# Patient Record
Sex: MALE | URBAN - METROPOLITAN AREA
[De-identification: names, ages, dates, MRNs, and addresses within clinical notes are randomized per-mention and may not be internally consistent; named-entity substitution may affect disease eponyms.]

---

## 2024-01-31 ENCOUNTER — APPOINTMENT (RX ONLY)
Dept: URBAN - METROPOLITAN AREA CLINIC 2 | Facility: CLINIC | Age: 24
Setting detail: DERMATOLOGY
End: 2024-01-31

## 2024-01-31 DIAGNOSIS — L64.8 OTHER ANDROGENIC ALOPECIA: ICD-10-CM | Status: INADEQUATELY CONTROLLED

## 2024-01-31 PROCEDURE — ? TREATMENT REGIMEN

## 2024-01-31 PROCEDURE — ? COUNSELING

## 2024-01-31 PROCEDURE — 99204 OFFICE O/P NEW MOD 45 MIN: CPT | Mod: 95

## 2024-01-31 PROCEDURE — ? PRESCRIPTION

## 2024-01-31 RX ORDER — MINOXIDIL 2.5 MG/1
1 TABLET ORAL QD
Qty: 30 | Refills: 11 | Status: ERX | COMMUNITY
Start: 2024-01-31

## 2024-01-31 RX ADMIN — MINOXIDIL 1: 2.5 TABLET ORAL at 00:00

## 2024-01-31 ASSESSMENT — LOCATION ZONE DERM: LOCATION ZONE: FACE

## 2024-01-31 ASSESSMENT — LOCATION SIMPLE DESCRIPTION DERM: LOCATION SIMPLE: LEFT FOREHEAD

## 2024-01-31 ASSESSMENT — LOCATION DETAILED DESCRIPTION DERM: LOCATION DETAILED: LEFT SUPERIOR FOREHEAD

## 2024-01-31 NOTE — PROCEDURE: TREATMENT REGIMEN
Detail Level: Generalized
Plan: Consider Finasteride in ~6 months if Minoxidil doesn’t improve hair loss.\\nAdvised pt to upload photos to his chart so we can follow his hair growth.
Initiate Treatment: Minoxidil oral daily. Start with 1/2 pill QD for 3-4 weeks then if tolerating ok can increase to 1 pill QD.

## 2024-01-31 NOTE — HPI: HAIR LOSS
Previous Labs: No
How Did The Hair Loss Occur?: gradual in onset
How Severe Is Your Hair Loss?: mild
What Hair Products Do You Use?: Cnochita for styling

## 2024-06-05 ENCOUNTER — APPOINTMENT (RX ONLY)
Dept: URBAN - METROPOLITAN AREA CLINIC 2 | Facility: CLINIC | Age: 24
Setting detail: DERMATOLOGY
End: 2024-06-05

## 2024-06-05 DIAGNOSIS — L64.8 OTHER ANDROGENIC ALOPECIA: ICD-10-CM

## 2024-06-05 PROCEDURE — ? COUNSELING

## 2024-06-05 PROCEDURE — ? TREATMENT REGIMEN

## 2024-06-05 PROCEDURE — 99213 OFFICE O/P EST LOW 20 MIN: CPT

## 2024-06-05 ASSESSMENT — LOCATION ZONE DERM: LOCATION ZONE: FACE

## 2024-06-05 ASSESSMENT — LOCATION SIMPLE DESCRIPTION DERM
LOCATION SIMPLE: RIGHT FOREHEAD
LOCATION SIMPLE: LEFT FOREHEAD

## 2024-06-05 ASSESSMENT — LOCATION DETAILED DESCRIPTION DERM
LOCATION DETAILED: RIGHT SUPERIOR FOREHEAD
LOCATION DETAILED: LEFT SUPERIOR FOREHEAD

## 2024-06-05 NOTE — PROCEDURE: TREATMENT REGIMEN
Detail Level: Generalized
Plan: Advised pt to upload photos to his chart so we can follow his hair growth.\\nDiscussed using finasteride or low dose oral minoxidil if there is no improvement using the minoxidil 5% topical foam for six months.\\nIf decide to start oral minoxidil, begin with 1/2 pill BID for 3-4 weeks. If tolerated, increase to 1 pill BID.
Initiate Treatment: Patient to apply minoxidil foam 5% BID to affected areas for sixth months